# Patient Record
Sex: MALE | Race: WHITE | Employment: FULL TIME
[De-identification: names, ages, dates, MRNs, and addresses within clinical notes are randomized per-mention and may not be internally consistent; named-entity substitution may affect disease eponyms.]

---

## 2024-07-04 ENCOUNTER — HOSPITAL ENCOUNTER (EMERGENCY)
Facility: HOSPITAL | Age: 44
Discharge: HOME | End: 2024-07-05
Attending: EMERGENCY MEDICINE
Payer: COMMERCIAL

## 2024-07-04 DIAGNOSIS — T30.0 ERYTHEMA DUE TO BURN (FIRST DEGREE): Primary | ICD-10-CM

## 2024-07-04 DIAGNOSIS — T21.22XA PARTIAL THICKNESS BURN OF ABDOMEN, INITIAL ENCOUNTER: ICD-10-CM

## 2024-07-04 PROCEDURE — 99284 EMERGENCY DEPT VISIT MOD MDM: CPT

## 2024-07-04 PROCEDURE — 96372 THER/PROPH/DIAG INJ SC/IM: CPT

## 2024-07-04 RX ORDER — LISINOPRIL AND HYDROCHLOROTHIAZIDE 12.5; 1 MG/1; MG/1
1 TABLET ORAL DAILY
COMMUNITY

## 2024-07-04 ASSESSMENT — LIFESTYLE VARIABLES
HOW OFTEN DO YOU HAVE A DRINK CONTAINING ALCOHOL: 4 OR MORE TIMES A WEEK
HOW MANY STANDARD DRINKS CONTAINING ALCOHOL DO YOU HAVE ON A TYPICAL DAY: 3 OR 4

## 2024-07-04 ASSESSMENT — PAIN DESCRIPTION - ORIENTATION: ORIENTATION: LEFT

## 2024-07-04 ASSESSMENT — PAIN DESCRIPTION - LOCATION: LOCATION: HAND;LEG

## 2024-07-04 ASSESSMENT — PAIN - FUNCTIONAL ASSESSMENT: PAIN_FUNCTIONAL_ASSESSMENT: 0-10

## 2024-07-04 ASSESSMENT — PAIN SCALES - GENERAL: PAINLEVEL_OUTOF10: 10

## 2024-07-05 VITALS
TEMPERATURE: 97.3 F | BODY MASS INDEX: 27.09 KG/M2 | WEIGHT: 200 LBS | SYSTOLIC BLOOD PRESSURE: 130 MMHG | RESPIRATION RATE: 20 BRPM | HEIGHT: 72 IN | DIASTOLIC BLOOD PRESSURE: 85 MMHG | OXYGEN SATURATION: 97 % | HEART RATE: 96 BPM

## 2024-07-05 PROCEDURE — 90471 IMMUNIZATION ADMIN: CPT | Performed by: EMERGENCY MEDICINE

## 2024-07-05 PROCEDURE — 6370000000 HC RX 637 (ALT 250 FOR IP): Performed by: EMERGENCY MEDICINE

## 2024-07-05 PROCEDURE — 6360000002 HC RX W HCPCS: Performed by: EMERGENCY MEDICINE

## 2024-07-05 PROCEDURE — 90714 TD VACC NO PRESV 7 YRS+ IM: CPT | Performed by: EMERGENCY MEDICINE

## 2024-07-05 RX ORDER — MAGNESIUM HYDROXIDE/ALUMINUM HYDROXICE/SIMETHICONE 120; 1200; 1200 MG/30ML; MG/30ML; MG/30ML
15 SUSPENSION ORAL
Status: COMPLETED | OUTPATIENT
Start: 2024-07-05 | End: 2024-07-05

## 2024-07-05 RX ORDER — KETOROLAC TROMETHAMINE 30 MG/ML
60 INJECTION, SOLUTION INTRAMUSCULAR; INTRAVENOUS
Status: COMPLETED | OUTPATIENT
Start: 2024-07-05 | End: 2024-07-05

## 2024-07-05 RX ORDER — ACETAMINOPHEN 500 MG
1000 TABLET ORAL EVERY 8 HOURS PRN
Qty: 360 TABLET | Refills: 1 | Status: SHIPPED | OUTPATIENT
Start: 2024-07-05

## 2024-07-05 RX ORDER — ACETAMINOPHEN 500 MG
1000 TABLET ORAL
Status: COMPLETED | OUTPATIENT
Start: 2024-07-05 | End: 2024-07-05

## 2024-07-05 RX ORDER — FAMOTIDINE 20 MG/1
20 TABLET, FILM COATED ORAL
Status: COMPLETED | OUTPATIENT
Start: 2024-07-05 | End: 2024-07-05

## 2024-07-05 RX ORDER — HYDROCODONE BITARTRATE AND ACETAMINOPHEN 5; 325 MG/1; MG/1
1 TABLET ORAL EVERY 8 HOURS PRN
Qty: 6 TABLET | Refills: 0 | Status: SHIPPED | OUTPATIENT
Start: 2024-07-05 | End: 2024-07-07

## 2024-07-05 RX ORDER — IBUPROFEN 200 MG
400 TABLET ORAL EVERY 8 HOURS PRN
Qty: 20 TABLET | Refills: 0 | Status: SHIPPED | OUTPATIENT
Start: 2024-07-05 | End: 2024-07-12

## 2024-07-05 RX ORDER — GINSENG 100 MG
CAPSULE ORAL
Status: COMPLETED | OUTPATIENT
Start: 2024-07-05 | End: 2024-07-05

## 2024-07-05 RX ADMIN — CLOSTRIDIUM TETANI TOXOID ANTIGEN (FORMALDEHYDE INACTIVATED) AND CORYNEBACTERIUM DIPHTHERIAE TOXOID ANTIGEN (FORMALDEHYDE INACTIVATED) 0.5 ML: 5; 2 INJECTION, SUSPENSION INTRAMUSCULAR at 00:20

## 2024-07-05 RX ADMIN — BACITRACIN: 500 OINTMENT TOPICAL at 00:33

## 2024-07-05 RX ADMIN — KETOROLAC TROMETHAMINE 60 MG: 60 INJECTION, SOLUTION INTRAMUSCULAR at 00:26

## 2024-07-05 RX ADMIN — ACETAMINOPHEN 1000 MG: 500 TABLET ORAL at 00:19

## 2024-07-05 RX ADMIN — ALUMINUM HYDROXIDE, MAGNESIUM HYDROXIDE, AND SIMETHICONE 15 ML: 1200; 120; 1200 SUSPENSION ORAL at 00:55

## 2024-07-05 RX ADMIN — FAMOTIDINE 20 MG: 20 TABLET, FILM COATED ORAL at 00:55
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
